# Patient Record
Sex: FEMALE | Race: WHITE | NOT HISPANIC OR LATINO
[De-identification: names, ages, dates, MRNs, and addresses within clinical notes are randomized per-mention and may not be internally consistent; named-entity substitution may affect disease eponyms.]

---

## 2017-08-08 ENCOUNTER — TRANSCRIPTION ENCOUNTER (OUTPATIENT)
Age: 37
End: 2017-08-08

## 2018-01-08 ENCOUNTER — TRANSCRIPTION ENCOUNTER (OUTPATIENT)
Age: 38
End: 2018-01-08

## 2019-01-31 ENCOUNTER — TRANSCRIPTION ENCOUNTER (OUTPATIENT)
Age: 39
End: 2019-01-31

## 2019-02-04 ENCOUNTER — TRANSCRIPTION ENCOUNTER (OUTPATIENT)
Age: 39
End: 2019-02-04

## 2019-04-07 ENCOUNTER — TRANSCRIPTION ENCOUNTER (OUTPATIENT)
Age: 39
End: 2019-04-07

## 2022-07-08 PROBLEM — Z00.00 ENCOUNTER FOR PREVENTIVE HEALTH EXAMINATION: Status: ACTIVE | Noted: 2022-07-08

## 2022-07-25 ENCOUNTER — APPOINTMENT (OUTPATIENT)
Dept: NEUROLOGY | Facility: CLINIC | Age: 42
End: 2022-07-25

## 2022-07-25 VITALS
SYSTOLIC BLOOD PRESSURE: 131 MMHG | TEMPERATURE: 98.3 F | OXYGEN SATURATION: 99 % | HEIGHT: 69 IN | DIASTOLIC BLOOD PRESSURE: 82 MMHG | WEIGHT: 213 LBS | HEART RATE: 71 BPM | BODY MASS INDEX: 31.55 KG/M2

## 2022-07-25 DIAGNOSIS — F41.9 ANXIETY DISORDER, UNSPECIFIED: ICD-10-CM

## 2022-07-25 PROCEDURE — 99205 OFFICE O/P NEW HI 60 MIN: CPT

## 2022-07-25 NOTE — PHYSICAL EXAM
[Person] : oriented to person [Place] : oriented to place [Time] : oriented to time [Concentration Intact] : normal concentrating ability [Visual Intact] : visual attention was ~T not ~L decreased [Naming Objects] : no difficulty naming common objects [Repeating Phrases] : no difficulty repeating a phrase [Writing A Sentence] : no difficulty writing a sentence [Fluency] : fluency intact [Comprehension] : comprehension intact [Reading] : reading intact [Past History] : adequate knowledge of personal past history [Cranial Nerves Optic (II)] : visual acuity intact bilaterally,  visual fields full to confrontation, pupils equal round and reactive to light [Cranial Nerves Oculomotor (III)] : extraocular motion intact [Cranial Nerves Trigeminal (V)] : facial sensation intact symmetrically [Cranial Nerves Facial (VII)] : face symmetrical [Cranial Nerves Vestibulocochlear (VIII)] : hearing was intact bilaterally [Motor Tone] : muscle tone was normal in all four extremities [Motor Strength] : muscle strength was normal in all four extremities [No Muscle Atrophy] : normal bulk in all four extremities [Motor Handedness Right-Handed] : the patient is right hand dominant [Sensation Tactile Decrease] : light touch was intact [Abnormal Walk] : normal gait [Balance] : balance was intact [Past-pointing] : there was no past-pointing [Tremor] : no tremor present [2+] : Ankle jerk left 2+ [Plantar Reflex Right Only] : normal on the right [Plantar Reflex Left Only] : normal on the left [FreeTextEntry4] : 2/3 at 5 m

## 2022-07-25 NOTE — HISTORY OF PRESENT ILLNESS
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  Normal milestones\par \par She has 2 children and works full-time.  She drives.\par \par Drinks alcohol.\par \par No history of head injuries, meningitis or any CNS disorders.\par \par No current history of systemic illnesses.\par No psychiatric history except for a history of anxiety for many years\par \par \par HPI\par \par Lorna reports that approximately in February 2021 she had a weird feeling and then lost awareness for a few seconds.  Eventually this episodes became more frequent and she was referred to see a neurologist.  She saw a neurologist in [place] Dr. Duggan who did an EEG that was normal and also an ambulatory EEG 48 hours were several episodes were captured.  She was told at one point that she had some type of seizures but did not start medications.  Eventually she went back to him and he told her that the EEG was normal.\par \par It is unclear what happened next but over the past 6 months these episodes have become more and more frequent.  She had a brain MRI that was normal.  Report is attached to the medical records.  She had also some blood work that was reported unremarkable.\par \par Recurrent episodes\par She described the episodes as a feeling of losing awareness for a few seconds.  She says she feels that they are very short.  She then comes back and is little and is a little bit slow but then she eventually recovers within seconds.  I spoke to the  on the phone who reported that she always does a very stereotyped type of behavior.\par She reports that she always verbalizes saying all my God oh my God all my God while she is moving her fingers and tapping her thumbs.  She is amnestic of the events.  The events according to him last between 10 and 15 seconds and another 5 seconds to recover.  He does not believe she is groggy or sleepy after these episodes.  The episodes are occurring almost every day and at times she may have up to 3 a day.\par With 2 of these episodes she had urinary incontinence.  She was standing on of the grocery stores to check out and then suddenly saw that she had an episode and was wet.  Another episode was when she was getting into the car.\par She says she has had episodes while driving and never had an accident.  She reports that she can think of certain things like relax and that is when the episodes will occur.\par Frequency: At least 1 a day.  Sometimes may go a day without them.\par Triggers: Relaxation or thinking about the episodes\par \par No anxiety in general associated with them\par

## 2022-07-25 NOTE — ASSESSMENT
[FreeTextEntry1] : Since she had already an EEG and an ambulatory EEG without a clear diagnosis I believe that she is required to have a video EEG to obtain a firm diagnosis.  If these are focal seizures then she will begin treatment with an anticonvulsant drug.  If they are not then appropriate diagnosis and treatment will need to follow since these are occurring almost every day.\par \par I told her that there is a risk of accidents with these episodes and she should refrain from driving.\par \par The patient would like to be admitted this week so we can sort this out as she has to be moving to [place] in the next few weeks

## 2022-07-25 NOTE — DISCUSSION/SUMMARY
[FreeTextEntry1] : 41-year-old woman with a year and a half history of possible partial seizures.  The behavior is stereotyped and the frequency may suggest frontal lobe epilepsy.\par The fact that she verbalizes during the episodes may lateralize her to the nondominant hemisphere.

## 2022-08-01 ENCOUNTER — TRANSCRIPTION ENCOUNTER (OUTPATIENT)
Age: 42
End: 2022-08-01

## 2022-08-01 ENCOUNTER — INPATIENT (INPATIENT)
Facility: HOSPITAL | Age: 42
LOS: 0 days | Discharge: LEFT BEFORE DISCHARGE | DRG: 101 | End: 2022-08-01
Attending: PSYCHIATRY & NEUROLOGY | Admitting: PSYCHIATRY & NEUROLOGY
Payer: COMMERCIAL

## 2022-08-01 VITALS
WEIGHT: 217.6 LBS | HEIGHT: 68 IN | RESPIRATION RATE: 18 BRPM | TEMPERATURE: 98 F | DIASTOLIC BLOOD PRESSURE: 84 MMHG | SYSTOLIC BLOOD PRESSURE: 123 MMHG | HEART RATE: 78 BPM | OXYGEN SATURATION: 98 %

## 2022-08-01 PROCEDURE — 95718 EEG PHYS/QHP 2-12 HR W/VEEG: CPT

## 2022-08-01 PROCEDURE — 99222 1ST HOSP IP/OBS MODERATE 55: CPT

## 2022-08-01 PROCEDURE — 95819 EEG AWAKE AND ASLEEP: CPT

## 2022-08-01 PROCEDURE — 95813 EEG EXTND MNTR 61-119 MIN: CPT

## 2022-08-01 RX ORDER — ACETAMINOPHEN 500 MG
650 TABLET ORAL EVERY 6 HOURS
Refills: 0 | Status: DISCONTINUED | OUTPATIENT
Start: 2022-08-01 | End: 2022-08-01

## 2022-08-01 NOTE — H&P ADULT - ATTENDING COMMENTS
agree with above H+P which was edited where appropriate. Admitted for VEEG with activating procedures for capture/ characterization of spells for diagnostic purposes.

## 2022-08-01 NOTE — DISCHARGE NOTE PROVIDER - CARE PROVIDER_API CALL
Seb Vernon)  Neurology  [address] [place] [address]  Phone: [phone]  Fax: [phone]  Established Patient  Follow Up Time:

## 2022-08-01 NOTE — DISCHARGE NOTE PROVIDER - NSDCCPCAREPLAN_GEN_ALL_CORE_FT
PRINCIPAL DISCHARGE DIAGNOSIS  Diagnosis: Focal epilepsy with impairment of consciousness  Assessment and Plan of Treatment:       SECONDARY DISCHARGE DIAGNOSES  Diagnosis: Anxiety  Assessment and Plan of Treatment:

## 2022-08-01 NOTE — DISCHARGE NOTE PROVIDER - HOSPITAL COURSE
49 yo right handed F w/o significant PMH p/w episodes of paroxysmal behavioral changes associated with amnesia for approximately 1.5 years. Per the patient she was experiencing these episodes since February 2021. She described the episodes as a feeling of losing awareness for a few seconds. She feels that they are very short. She then comes back and is little and is a little bit slow but then she eventually recovers within seconds. She reports that she always verbalizes saying "Oh my God oh my God all my God" while she is moving her fingers and tapping her thumbs. She is amnestic of the events. The events according to him last between 10 and 15 seconds and another 5 seconds to recover. He does not believe she is groggy or sleepy after these episodes. The episodes are occurring almost every day and at times she may have up to 3 a day. With 2 of these episodes she had urinary incontinence. She was standing on of the grocery stores to check out and then suddenly saw that she had an episode and was wet. Another episode was when she was getting into the car.  She says she has had episodes while driving and never had an accident. She reports that she can think of certain things like relax and that is when the episodes will occur. Frequency: At least 1 a day. Sometimes may go a day without them. Triggers: Relaxation or thinking about the episodes.   Eventually this episodes became more frequent and she was referred to see a neurologist. She saw a neurologist in [place] Dr. Duggan who did an EEG that was normal and also an ambulatory EEG 48 hours were several episodes were captured. She was told at one point that she had some type of seizures but did not start medications. Eventually she went back to him and he told her that the EEG was normal. Over the past 6 months these episodes have become more and more frequent. She had a brain MRI that was normal. She had also some blood work that was reported unremarkable.   She was admitted to EMU for 24 h VEEG and initially refused for IV-line and was cautious about diagnostic lab draws then she agrees to do only once. Soon after VEEG leads were hooked up she removed the EEG wires and eloped with refusing sign of AMA form.     47 yo right handed F w/o significant PMH p/w episodes of paroxysmal behavioral changes associated with amnesia for approximately 1.5 years. Per the patient she was experiencing these episodes since February 2021. She described the episodes as a feeling of losing awareness for a few seconds. She feels that they are very short. She then comes back and is little and is a little bit slow but then she eventually recovers within seconds. She reports that she always verbalizes saying "Oh my God oh my God all my God" while she is moving her fingers and tapping her thumbs. She is amnestic of the events. The events according to him last between 10 and 15 seconds and another 5 seconds to recover. He does not believe she is groggy or sleepy after these episodes. The episodes are occurring almost every day and at times she may have up to 3 a day. With 2 of these episodes she had urinary incontinence. She was standing on of the grocery stores to check out and then suddenly saw that she had an episode and was wet. Another episode was when she was getting into the car.  She says she has had episodes while driving and never had an accident. She reports that she can think of certain things like relax and that is when the episodes will occur. Frequency: At least 1 a day. Sometimes may go a day without them. Triggers: Relaxation or thinking about the episodes.   Eventually this episodes became more frequent and she was referred to see a neurologist. She saw a neurologist in [place] Dr. Duggan who did an EEG that was normal and also an ambulatory EEG 48 hours were several episodes were captured. She was told at one point that she had some type of seizures but did not start medications. Eventually she went back to him and he told her that the EEG was normal. Over the past 6 months these episodes have become more and more frequent. She had a brain MRI that was normal. She had also some blood work that was reported unremarkable.     She was admitted to EMU for 24 h VEEG and initially refused for IV-line and was cautious about diagnostic lab draws then she agrees to do only once. Soon after VEEG leads were hooked up she removed the EEG wires and eloped with refusing sign of AMA form reportedly stating that there were too many rules here. Dr. Vernon, referring MD made aware.

## 2022-08-01 NOTE — PROVIDER CONTACT NOTE (OTHER) - SITUATION
Patient refusing peripheral IV. Patient educated on risk of seizure and seizure precautions. Patient continued to refuse.

## 2022-08-01 NOTE — H&P ADULT - ASSESSMENT
41-year-old woman with a year and a half history of possible partial seizures. The behavior is stereotyped and the frequency may suggest frontal lobe epilepsy. The fact that she verbalizes during the episodes may lateralize her to the nondominant hemisphere. Since she had already an EEG and an ambulatory EEG without a clear diagnosis she is required to have a video EEG to obtain a firm diagnosis. If these are focal seizures then she will begin treatment with an anticonvulsant drug. If they are not then appropriate diagnosis and treatment will need to follow since these are occurring almost every day.    Assessment:     Epilepsy, focal (345.50) (G40.109)    Anxiety (300.00) (F41.9)      Plan:     VEEG - 24h

## 2022-08-01 NOTE — H&P ADULT - NSHPREVIEWOFSYSTEMS_GEN_ALL_CORE
CONSTITUTIONAL: Positive for 7lb unintentional weight loss negative for fever, chills, weakness or fatigue.     HEENT:  Eyes:  No visual loss, blurred vision, double vision or yellow sclerae. Ears, Nose, Throat:  No hearing loss, sneezing, congestion, runny nose or sore throat.     SKIN:  No rash or itching.     CARDIOVASCULAR:  No chest pain, chest pressure or chest discomfort. No palpitations or edema.     RESPIRATORY:  No shortness of breath, cough or sputum.     GASTROINTESTINAL:  No anorexia, nausea, vomiting or diarrhea. No abdominal pain or blood.     GENITOURINARY: No Burning on urination.      NEUROLOGICAL:  see HPI     MUSCULOSKELETAL:  No muscle, back pain, joint pain or stiffness.     HEMATOLOGIC:  No anemia, bleeding or bruising.     LYMPHATICS:  No enlarged nodes. No history of splenectomy.     PSYCHIATRIC:  No history of depression. Admits she is anxious.     ENDOCRINOLOGIC:  No reports of sweating, cold or heat intolerance. No polyuria or polydipsia.     ALLERGIES:  No history of asthma, hives, eczema or rhinitis.

## 2022-08-01 NOTE — H&P ADULT - HISTORY OF PRESENT ILLNESS
47 yo right handed F w/o significant PMH p/w episodes of paroxysmal behavioral changes associated with amnesia for approximately 1.5 years. Per the patient she was experiencing these episodes since February 2021. She described the episodes as a feeling of losing awareness for a few seconds. She feels that they are very short. She then comes back and is little and is a little bit slow but then she eventually recovers within seconds. She reports that she always verbalizes saying all my God oh my God all my God while she is moving her fingers and tapping her thumbs. She is amnestic of the events. The events according to him last between 10 and 15 seconds and another 5 seconds to recover. He does not believe she is groggy or sleepy after these episodes. The episodes are occurring almost every day and at times she may have up to 3 a day. With 2 of these episodes she had urinary incontinence. She was standing on of the grocery stores to check out and then suddenly saw that she had an episode and was wet. Another episode was when she was getting into the car.  She says she has had episodes while driving and never had an accident. She reports that she can think of certain things like relax and that is when the episodes will occur. Frequency: At least 1 a day. Sometimes may go a day without them. Triggers: Relaxation or thinking about the episodes.   Eventually this episodes became more frequent and she was referred to see a neurologist. She saw a neurologist in [place] Dr. Duggan who did an EEG that was normal and also an ambulatory EEG 48 hours were several episodes were captured. She was told at one point that she had some type of seizures but did not start medications. Eventually she went back to him and he told her that the EEG was normal. Over the past 6 months these episodes have become more and more frequent. She had a brain MRI that was normal. She had also some blood work that was reported unremarkable. She is here for 24 h VEEG

## 2022-08-01 NOTE — PATIENT PROFILE ADULT - FALL HARM RISK - HARM RISK INTERVENTIONS

## 2022-08-01 NOTE — H&P ADULT - NSHPSOCIALHISTORY_GEN_ALL_CORE
, has 2 children, works full-time. No smoking, drinks socially. LMP - she is in the middle of her normal periods , lives with  and her 2 children, works full-time. No smoking, drinks socially. LMP - she is in the middle of her normal periods

## 2022-08-01 NOTE — H&P ADULT - NSHPPHYSICALEXAM_GEN_ALL_CORE
Orientation: oriented to person, oriented to place and oriented to time.   Attention: normal concentrating ability and visual attention was not decreased.   Language: no difficulty naming common objects, no difficulty repeating a phrase, no difficulty writing a sentence, fluency intact, comprehension intact and reading intact.   Fund of knowledge: displays adequate knowledge of personal past history.   Cranial Nerves: visual acuity intact bilaterally, visual fields full to confrontation, pupils equal round and reactive to light, extraocular motion intact, facial sensation intact symmetrically, face symmetrical and hearing was intact bilaterally.   Motor: muscle tone was normal in all four extremities, muscle strength was normal in all four extremities and normal bulk in all four extremities.   Motor Strength:. the patient is right hand dominant.   Sensory exam: light touch was intact.   Coordination:. normal gait. balance was intact. there was no past-pointing. no tremor present.   Deep tendon reflexes:   Biceps right 2+. Biceps left 2+.    Triceps right 2+. Triceps left 2+.    Brachioradialis right 2+. Brachioradialis left 2+.    Patella right 2+. Patella left 2+.    Ankle jerk right 2+. Ankle jerk left 2+.   Plantar responses normal on the right, normal on the left.

## 2022-08-02 PROBLEM — Z78.9 OTHER SPECIFIED HEALTH STATUS: Chronic | Status: ACTIVE | Noted: 2022-08-01

## 2022-08-02 NOTE — EEG REPORT - NS EEG TEXT BOX
[facility] Department of Neurology  Inpatient Continuous video-Electroencephalogram    Patient Name:	LORY MITCHELL    :	1980  MRN:	[id number]    Study Start Date/Time:  2022, 12:14 PM  Study End Date/Time:	2022, 2:31 PM    Referred by: Seb Vernon MD    Brief Clinical History:  LORY MITCHELL is a 41 year-old female with very brief amnestic episodes of unclear etiology; study performed to investigate for seizures or markers of epilepsy.    Diagnosis Code:   R56.9 convulsions/seizure  The live video was: monitored continuously by trained technicians.    Pertinent Medications:  n/a    Acquisition Details:  Electroencephalography was acquired using a minimum of 21 channels on an [employer] system v 8.5.1 with electrode placement according to the standard International 10-20 system following ACNS (American Clinical Neurophysiology Society) guidelines for Long-Term Video EEG monitoring.  Anterior temporal T1 and T2 electrodes were utilized whenever possible.   The [employer] automated spike & seizure detections were all reviewed in detail, in addition to extensive portions of raw EEG.    Day 1: 2022 @ 12:14 PM to 2:31 PM  Background:  continuous, with predominantly alpha and beta frequencies.  Symmetry:  No persistent asymmetries of voltage or frequency.  Posterior Dominant Rhythm:  11 Hz symmetric, well-organized, and well-modulated.  Organization: Appropriate anterior-posterior gradient.  Voltage:  Normal (20+ uV)  Variability: Yes. 		Reactivity: Yes.  N2 sleep: not captured  Spontaneous Activity:  No epileptiform discharges.  Periodic/rhythmic activity:  None  Events:  No electrographic seizures or significant clinical events.  Provocations:  Hyperventilation and Photic stimulation: did not evoke EEG abnormalities.    Daily Summary:    Normal awake EEG recording. Study aborted prematurely at patient request.     John Lin DO  CNP Fellow    Lorene Joseph DO  Attending Neurologist, [facility]

## 2022-08-03 ENCOUNTER — APPOINTMENT (OUTPATIENT)
Dept: NEUROLOGY | Facility: CLINIC | Age: 42
End: 2022-08-03

## 2022-08-03 PROCEDURE — 95819 EEG AWAKE AND ASLEEP: CPT

## 2022-08-04 ENCOUNTER — APPOINTMENT (OUTPATIENT)
Dept: NEUROLOGY | Facility: CLINIC | Age: 42
End: 2022-08-04

## 2022-08-04 ENCOUNTER — APPOINTMENT (OUTPATIENT)
Dept: MRI IMAGING | Facility: HOSPITAL | Age: 42
End: 2022-08-04

## 2022-08-04 ENCOUNTER — OUTPATIENT (OUTPATIENT)
Dept: OUTPATIENT SERVICES | Facility: HOSPITAL | Age: 42
LOS: 1 days | End: 2022-08-04
Payer: COMMERCIAL

## 2022-08-04 ENCOUNTER — RESULT REVIEW (OUTPATIENT)
Age: 42
End: 2022-08-04

## 2022-08-04 VITALS
HEIGHT: 69 IN | SYSTOLIC BLOOD PRESSURE: 124 MMHG | HEART RATE: 74 BPM | OXYGEN SATURATION: 98 % | DIASTOLIC BLOOD PRESSURE: 80 MMHG | TEMPERATURE: 99.1 F

## 2022-08-04 DIAGNOSIS — G40.109 LOCALIZATION-RELATED (FOCAL) (PARTIAL) SYMPTOMATIC EPILEPSY AND EPILEPTIC SYNDROMES WITH SIMPLE PARTIAL SEIZURES, NOT INTRACTABLE, WITHOUT STATUS EPILEPTICUS: ICD-10-CM

## 2022-08-04 DIAGNOSIS — Z53.29 PROCEDURE AND TREATMENT NOT CARRIED OUT BECAUSE OF PATIENT'S DECISION FOR OTHER REASONS: ICD-10-CM

## 2022-08-04 DIAGNOSIS — F41.9 ANXIETY DISORDER, UNSPECIFIED: ICD-10-CM

## 2022-08-04 PROCEDURE — 99214 OFFICE O/P EST MOD 30 MIN: CPT

## 2022-08-04 PROCEDURE — A9585: CPT

## 2022-08-04 PROCEDURE — 95708 EEG WO VID EA 12-26HR UNMNTR: CPT

## 2022-08-04 PROCEDURE — 95700 EEG CONT REC W/VID EEG TECH: CPT

## 2022-08-04 PROCEDURE — 70553 MRI BRAIN STEM W/O & W/DYE: CPT | Mod: 26

## 2022-08-04 PROCEDURE — 95719 EEG PHYS/QHP EA INCR W/O VID: CPT

## 2022-08-04 PROCEDURE — 70553 MRI BRAIN STEM W/O & W/DYE: CPT

## 2022-08-04 NOTE — HISTORY OF PRESENT ILLNESS
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  Normal milestones\par \par She has 2 children and works full-time.  She drives.\par \par Drinks alcohol.\par \par No history of head injuries, meningitis or any CNS disorders.\par \par No current history of systemic illnesses.\par No psychiatric history except for a history of anxiety for many years\par \par \par HPI\par \par Lorna reports that approximately in February 2021 she had a weird feeling and then lost awareness for a few seconds.  Eventually this episodes became more frequent and she was referred to see a neurologist.  She saw a neurologist in [place] Dr. Duggan who did an EEG that was normal and also an ambulatory EEG 48 hours were several episodes were captured.  She was told at one point that she had some type of seizures but did not start medications.  Eventually she went back to him and he told her that the EEG was normal.\par \par It is unclear what happened next but over the past 6 months these episodes have become more and more frequent.  She had a brain MRI that was normal.  Report is attached to the medical records.  She had also some blood work that was reported unremarkable.\par \par Recurrent episodes\par She described the episodes as a feeling of losing awareness for a few seconds.  She says she feels that they are very short.  She then comes back and is little and is a little bit slow but then she eventually recovers within seconds.  I spoke to the  on the phone who reported that she always does a very stereotyped type of behavior.\par She reports that she always verbalizes saying all my God oh my God all my God while she is moving her fingers and tapping her thumbs.  She is amnestic of the events.  The events according to him last between 10 and 15 seconds and another 5 seconds to recover.  He does not believe she is groggy or sleepy after these episodes.  The episodes are occurring almost every day and at times she may have up to 3 a day.\par With 2 of these episodes she had urinary incontinence.  She was standing on of the grocery stores to check out and then suddenly saw that she had an episode and was wet.  Another episode was when she was getting into the car.\par She says she has had episodes while driving and never had an accident.  She reports that she can think of certain things like relax and that is when the episodes will occur.\par Frequency: At least 1 a day.  Sometimes may go a day without them.\par Triggers: Relaxation or thinking about the episodes\par \par No anxiety in general associated with them\par \par HPI    8/4/22\par \par Admitted to hospital last week x VEEG. She got hook up and 2-3 hs later AMA because she couldn’t stand it. 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 \par REEG today shows freq Left temporal sharp waves

## 2022-08-04 NOTE — DISCUSSION/SUMMARY
[FreeTextEntry1] : 41-year-old woman with likely partial seizures.  The behavior is stereotyped and the frequency may suggest frontal lobe vs temporal epilepsy.\par MRI reported normal but it is not. 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

## 2022-08-04 NOTE — ASSESSMENT
[FreeTextEntry1] : Discussed starting AEDs. Choice of ZON, LAC, LTG\par Repeat MRI\par See result of AEEG\par \par \par Treatment, side effects and dosages were discussed in detail.\par \par Risk of allergic reactions, mood changes, lab etc discussed in details.\par \par Seizure precautions discussed in detail including restrictions on driving, machinery, etc. \par \par Seizure triggers including ETOH intake, drugs discussed in detail\par \par Patient questions answered.\par \par \par

## 2022-08-05 ENCOUNTER — APPOINTMENT (OUTPATIENT)
Dept: MRI IMAGING | Facility: HOSPITAL | Age: 42
End: 2022-08-05

## 2022-08-23 ENCOUNTER — TRANSCRIPTION ENCOUNTER (OUTPATIENT)
Age: 42
End: 2022-08-23

## 2022-08-29 ENCOUNTER — TRANSCRIPTION ENCOUNTER (OUTPATIENT)
Age: 42
End: 2022-08-29

## 2022-08-31 ENCOUNTER — APPOINTMENT (OUTPATIENT)
Dept: NEUROLOGY | Facility: CLINIC | Age: 42
End: 2022-08-31

## 2022-09-12 ENCOUNTER — TRANSCRIPTION ENCOUNTER (OUTPATIENT)
Age: 42
End: 2022-09-12

## 2022-09-13 ENCOUNTER — APPOINTMENT (OUTPATIENT)
Dept: NEUROLOGY | Facility: CLINIC | Age: 42
End: 2022-09-13

## 2022-09-13 PROCEDURE — 99214 OFFICE O/P EST MOD 30 MIN: CPT | Mod: 95

## 2022-09-13 NOTE — REASON FOR VISIT
[Home] : at home, [unfilled] , at the time of the visit. [Medical Office: ([facility])___] : at the medical office located in  [Patient] : the patient [Follow-Up: _____] : a [unfilled] follow-up visit

## 2022-09-13 NOTE — ASSESSMENT
[FreeTextEntry1] : Plan\par Increase Vimpat by 50 mg a week until she reaches her seizure freedom.\par She will have a repeat MRI on October 5 with the neurosurgeon at [place] and I told her to bring the MRI and I will see her on October 6.\par \par I asked her to call me if she has any issues.\par \par

## 2022-09-13 NOTE — HISTORY OF PRESENT ILLNESS
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  Normal milestones\par \par She has 2 children and works full-time.  She drives.\par \par Drinks alcohol.\par \par No history of head injuries, meningitis or any CNS disorders.\par \par No current history of systemic illnesses.\par No psychiatric history except for a history of anxiety for many years\par \par \par PMHx\par \par Lorna reports that approximately in February 2021 she had a weird feeling and then lost awareness for a few seconds.  Eventually this episodes became more frequent and she was referred to see a neurologist.  She saw a neurologist in [place] Dr. Duggan who did an EEG that was normal and also an ambulatory EEG 48 hours were several episodes were captured.  She was told at one point that she had some type of seizures but did not start medications.  Eventually she went back to him and he told her that the EEG was normal.\par \par It is unclear what happened next but over the past 6 months these episodes have become more and more frequent.  She had a brain MRI that was normal.  Report is attached to the medical records.  She had also some blood work that was reported unremarkable.\par \par Recurrent episodes\par She described the episodes as a feeling of losing awareness for a few seconds.  She says she feels that they are very short.  She then comes back and is little and is a little bit slow but then she eventually recovers within seconds.  I spoke to the  on the phone who reported that she always does a very stereotyped type of behavior.\par She reports that she always verbalizes saying all my God oh my God all my God while she is moving her fingers and tapping her thumbs.  She is amnestic of the events.  The events according to him last between 10 and 15 seconds and another 5 seconds to recover.  He does not believe she is groggy or sleepy after these episodes.  The episodes are occurring almost every day and at times she may have up to 3 a day.\par With 2 of these episodes she had urinary incontinence.  She was standing on of the grocery stores to check out and then suddenly saw that she had an episode and was wet.  Another episode was when she was getting into the car.\par She says she has had episodes while driving and never had an accident.  She reports that she can think of certain things like relax and that is when the episodes will occur.\par Frequency: At least 1 a day.  Sometimes may go a day without them.\par Triggers: Relaxation or thinking about the episodes\par \par No anxiety in general associated with them\par \par HPI     9/13/22\par \par Lorna has been doing better on Vimpat 100 mg twice a day which she increased over the past several weeks.\par She is working from [place] permanently.\par No side effects from the medication.\par She saw a neurosurgeon at [place] who told her that the left amygdala looked bigger but he could not confirm the diagnosis.\par She seems to have less anxiety as well.\par She lost her job and she is now in transition.  Kids in school in [place].  She is returning here in a few weeks.\par \par HPI    8/4/22\par \par Admitted to hospital last week x VEEG. She got hook up and 2-3 hs later AMA because she couldn’t stand it. 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 \par REEG today shows freq Left temporal sharp waves

## 2022-09-13 NOTE — DISCUSSION/SUMMARY
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  Now's she is down to maybe 1 seizure a week.  These are all simple partial events.

## 2022-09-13 NOTE — PHYSICAL EXAM
[Person] : oriented to person [Span Intact] : the attention span was normal [Naming Objects] : no difficulty naming common objects [Cranial Nerves Optic (II)] : visual acuity intact bilaterally,  visual fields full to confrontation, pupils equal round and reactive to light

## 2022-09-29 ENCOUNTER — TRANSCRIPTION ENCOUNTER (OUTPATIENT)
Age: 42
End: 2022-09-29

## 2022-09-30 ENCOUNTER — TRANSCRIPTION ENCOUNTER (OUTPATIENT)
Age: 42
End: 2022-09-30

## 2022-10-03 RX ORDER — LACOSAMIDE 50 MG/1
50 TABLET ORAL
Qty: 120 | Refills: 0 | Status: COMPLETED | COMMUNITY
Start: 2022-09-30 | End: 2022-10-03

## 2022-10-06 ENCOUNTER — APPOINTMENT (OUTPATIENT)
Dept: NEUROLOGY | Facility: CLINIC | Age: 42
End: 2022-10-06

## 2022-10-06 VITALS
SYSTOLIC BLOOD PRESSURE: 110 MMHG | DIASTOLIC BLOOD PRESSURE: 78 MMHG | HEART RATE: 87 BPM | WEIGHT: 218 LBS | HEIGHT: 69 IN | BODY MASS INDEX: 32.29 KG/M2 | TEMPERATURE: 98.1 F | OXYGEN SATURATION: 98 %

## 2022-10-06 PROCEDURE — 99214 OFFICE O/P EST MOD 30 MIN: CPT

## 2022-10-06 NOTE — PHYSICAL EXAM
[Person] : oriented to person [Span Intact] : the attention span was normal [Naming Objects] : no difficulty naming common objects [Cranial Nerves Optic (II)] : visual acuity intact bilaterally,  visual fields full to confrontation, pupils equal round and reactive to light [Cranial Nerves Trigeminal (V)] : facial sensation intact symmetrically [Cranial Nerves Facial (VII)] : face symmetrical

## 2022-10-06 NOTE — HISTORY OF PRESENT ILLNESS
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  Normal milestones\par \par She has 2 children and works full-time.  She drives.\par \par Drinks alcohol.\par \par No history of head injuries, meningitis or any CNS disorders.\par \par No current history of systemic illnesses.\par No psychiatric history except for a history of anxiety for many years\par \par \par PMHx\par \par Lorna reports that approximately in February 2021 she had a weird feeling and then lost awareness for a few seconds.  Eventually this episodes became more frequent and she was referred to see a neurologist.  She saw a neurologist in [place] Dr. Duggan who did an EEG that was normal and also an ambulatory EEG 48 hours were several episodes were captured.  She was told at one point that she had some type of seizures but did not start medications.  Eventually she went back to him and he told her that the EEG was normal.\par \par It is unclear what happened next but over the past 6 months these episodes have become more and more frequent.  She had a brain MRI that was normal.  Report is attached to the medical records.  She had also some blood work that was reported unremarkable.\par \par Recurrent episodes\par She described the episodes as a feeling of losing awareness for a few seconds.  She says she feels that they are very short.  She then comes back and is little and is a little bit slow but then she eventually recovers within seconds.  I spoke to the  on the phone who reported that she always does a very stereotyped type of behavior.\par She reports that she always verbalizes saying all my God oh my God all my God while she is moving her fingers and tapping her thumbs.  She is amnestic of the events.  The events according to him last between 10 and 15 seconds and another 5 seconds to recover.  He does not believe she is groggy or sleepy after these episodes.  The episodes are occurring almost every day and at times she may have up to 3 a day.\par With 2 of these episodes she had urinary incontinence.  She was standing on of the grocery stores to check out and then suddenly saw that she had an episode and was wet.  Another episode was when she was getting into the car.\par She says she has had episodes while driving and never had an accident.  She reports that she can think of certain things like relax and that is when the episodes will occur.\par Frequency: At least 1 a day.  Sometimes may go a day without them.\par Triggers: Relaxation or thinking about the episodes\par \par No anxiety in general associated with them\par \par HPI   10/6/22\par  Continue to have some seizures. Less frequent sz\par \par Meds:\par Vimpat 100 am and 150 mg pm\par \par MRI done yesterday at [place]. I reviewed the MRI. Still small thickening of Left amygdala \par \par HPI     9/13/22\par \par Lorna has been doing better on Vimpat 100 mg twice a day which she increased over the past several weeks.\par She is working from [place] permanently.\par No side effects from the medication.\par She saw a neurosurgeon at [place] who told her that the left amygdala looked bigger but he could not confirm the diagnosis.\par She seems to have less anxiety as well.\par She lost her job and she is now in transition.  Kids in school in [place].  She is returning here in a few weeks.\par \par HPI    8/4/22\par \par Admitted to hospital last week x VEEG. She got hook up and 2-3 hs later AMA because she couldn’t stand it. 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 \par REEG today shows freq Left temporal sharp waves

## 2022-10-20 PROBLEM — Z00.00 ENCOUNTER FOR PREVENTIVE HEALTH EXAMINATION: Noted: 2022-10-20

## 2022-10-26 ENCOUNTER — OUTPATIENT (OUTPATIENT)
Dept: OUTPATIENT SERVICES | Facility: HOSPITAL | Age: 42
LOS: 1 days | End: 2022-10-26
Payer: COMMERCIAL

## 2022-10-26 ENCOUNTER — APPOINTMENT (OUTPATIENT)
Dept: NUCLEAR MEDICINE | Facility: IMAGING CENTER | Age: 42
End: 2022-10-26

## 2022-10-26 ENCOUNTER — RESULT REVIEW (OUTPATIENT)
Age: 42
End: 2022-10-26

## 2022-10-26 DIAGNOSIS — Z00.8 ENCOUNTER FOR OTHER GENERAL EXAMINATION: ICD-10-CM

## 2022-10-26 DIAGNOSIS — G40.109 LOCALIZATION-RELATED (FOCAL) (PARTIAL) SYMPTOMATIC EPILEPSY AND EPILEPTIC SYNDROMES WITH SIMPLE PARTIAL SEIZURES, NOT INTRACTABLE, WITHOUT STATUS EPILEPTICUS: ICD-10-CM

## 2022-10-26 PROCEDURE — 78608 BRAIN IMAGING (PET): CPT

## 2022-10-26 PROCEDURE — 78608 BRAIN IMAGING (PET): CPT | Mod: 26,76

## 2022-10-26 PROCEDURE — A9552: CPT

## 2022-10-26 PROCEDURE — 78999 UNLISTED MISC PX DX NUC MED: CPT | Mod: 26

## 2022-10-26 PROCEDURE — 78999 UNLISTED MISC PX DX NUC MED: CPT

## 2022-11-02 ENCOUNTER — TRANSCRIPTION ENCOUNTER (OUTPATIENT)
Age: 42
End: 2022-11-02

## 2022-11-08 ENCOUNTER — APPOINTMENT (OUTPATIENT)
Dept: NEUROLOGY | Facility: CLINIC | Age: 42
End: 2022-11-08

## 2022-12-06 ENCOUNTER — RX RENEWAL (OUTPATIENT)
Age: 42
End: 2022-12-06

## 2023-01-01 NOTE — PATIENT PROFILE ADULT - NSPRESCRALCSIXMORE_GEN_A_NUR
"Patient Name: Robert Hart"  Medical Records Number: [id number]  YOB: 2024  Date of Appointment: 2023    Genetic counseling (Natasha Reddy, [facility], [facility]) met with the family of Robert Mejía on 2023 following to evaluation by Madie Hess MD, medical geneticist. The patient is currently admitted to the [facility] NICU. His parents and maternal grandparents were present for genetic counseling intake, which occurred in the waiting room per family preference. Total time spent in counseling and discussion totaled 20 minutes (Face-to-face: 20 minutes; Non face-to-face including preparation, medical record review, and literature review: 30 minutes). This document provides a written summary of topics discussed.     Patient Medical History  Robert Hammond is a 13 days male referred for genetic counseling and medical genetics evaluation based on a history of poor feeding, abnormal tone, and abnormal reflexes. At the time of the appointment, the family reported some anxiety around meeting with Genetics. These concerns were discussed as appropriate. A history of present illness and review of systems were collected.     Robert Hammond was delivered via repeat  at 39+1/7 weeks' gestation. His  mother was 34 and his father was 35. The pregnancy was unplanned and detected very early in the first trimester. Robert Hammond's mother reports good access to prenatal care and close monitoring due to her suboxone therapy and hepatitis C infection status. Robert Hammond's mother reports that she experienced significant swelling and nausea/vomiting in this pregnancy, similar to her previous pregnancy. She reports that Robert Hammond was more active in utero than her twin daughters did. She did not pursue any genetic testing or screening during the pregnancy. No fetal anomalies were noted prenatally. At delivery, Robert Hammond was 7lb 6.5oz, 49cm in length, and had a head circumference of 33cm. " "APGAR scores were 8/8. Due to respiratory distress and desats, Robert Hammond was admitted to the NICU for respiratory support. The family was told he had "swallowed fluid."    Upon admission to the NICU, Robert Hammond was noted to have abnormal tone, with particular hypertonia in the lower extremities. He has abnormal primitive refluxes, including a reduced or absent suck reflex, leading to poor feeding. An MRI of the brain was reported as normal; this study was limited due to significant motion artifact.     On physical exam, Dr. Hess noted distinctive facial and physical features, including macrodactyly of the great toes. The family report that Robert Hammond strongly resembles his maternal uncle, as well as his father's baby pictures. They also report that "big feet, big toes, and finger toes" are maternal family traits.      Additional medical history is notable for perineal dermatitis and  abstinence syndrome (managed with PRN morphine). Review of systems was otherwise negative.     When discharged, Robert Hammond will live in [place] with his parents and sisters.     Patient Family History  A family history was collected for Robert Hammond, with information provided by his parents and grandparents. A complete pedigree has been included in the medical record. Within the immediate family, Robert Hammond has three half-siblings, all healthy. His mother is 34 and has allergies. His father is 35 and has no medical concerns. Robert Hammond's mother experienced a first trimester miscarriage at age 18 with a previous partner.      Maternal family history includes no known individuals with diagnosed genetic disorders. A great-great aunt had "mental retardation," with reported onset following a head injury. A maternal uncle  at age 24 in a motorcycle accident. There is a distant family history of cancer (breast, colon, prostate) and diabetes. Maternal ancestry is Non- White (Northern Irish, Georgian, Stateless, Turks and [place] Islander). "     Paternal family history includes no known individuals with diagnosed genetic disorders. Paternal ancestry is Non- White (Lao, Sami, Turkish). Consanguinity was denied.        Recommendations  Please review clinical documentation by Dr. Hess for complete medical management and referral recommendations. The family is aware that I will contact them to discuss the plan for genetic testing. They are aware that genetic testing is offered as a resource for the family but is not mandatory, and does not dictate the length of Robert Hammond's NICU admission.    It was a pleasure meeting with Robert Hammond's family family. The family is encouraged to contact the Department of Genetics with any questions or concerns.        Zeus Reddy, [facility], [facility]  Senior Genetic Counselor  [facility]  [email or web address]      Never

## 2023-01-20 ENCOUNTER — RX RENEWAL (OUTPATIENT)
Age: 43
End: 2023-01-20

## 2023-01-30 ENCOUNTER — APPOINTMENT (OUTPATIENT)
Dept: NEUROLOGY | Facility: CLINIC | Age: 43
End: 2023-01-30
Payer: COMMERCIAL

## 2023-01-30 ENCOUNTER — RX RENEWAL (OUTPATIENT)
Age: 43
End: 2023-01-30

## 2023-01-30 PROCEDURE — 99214 OFFICE O/P EST MOD 30 MIN: CPT | Mod: 95

## 2023-01-30 NOTE — DISCUSSION/SUMMARY
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  Now's she is down to maybe 1 seizure a week.  These are all simple partial events.

## 2023-01-30 NOTE — ASSESSMENT
[FreeTextEntry1] : Vimpat level\par If the level is low we may increase dose by 50 mg and see if we can control 100% the seizures.

## 2023-01-30 NOTE — REASON FOR VISIT
[Home] : at home, [unfilled] , at the time of the visit. [Medical Office: ([facility])___] : at the medical office located in  [Patient] : the patient [Follow-Up: _____] : a [unfilled] follow-up visit

## 2023-01-30 NOTE — HISTORY OF PRESENT ILLNESS
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  Normal milestones\par \par She has 2 children and works full-time.  She drives.\par \par Drinks alcohol.\par \par No history of head injuries, meningitis or any CNS disorders.\par \par No current history of systemic illnesses.\par No psychiatric history except for a history of anxiety for many years\par \par \par PMHx\par \par Lorna reports that approximately in February 2021 she had a weird feeling and then lost awareness for a few seconds.  Eventually this episodes became more frequent and she was referred to see a neurologist.  She saw a neurologist in [place] Dr. Duggan who did an EEG that was normal and also an ambulatory EEG 48 hours were several episodes were captured.  She was told at one point that she had some type of seizures but did not start medications.  Eventually she went back to him and he told her that the EEG was normal.\par \par It is unclear what happened next but over the past 6 months these episodes have become more and more frequent.  She had a brain MRI that was normal.  Report is attached to the medical records.  She had also some blood work that was reported unremarkable.\par \par Recurrent episodes\par She described the episodes as a feeling of losing awareness for a few seconds.  She says she feels that they are very short.  She then comes back and is little and is a little bit slow but then she eventually recovers within seconds.  I spoke to the  on the phone who reported that she always does a very stereotyped type of behavior.\par She reports that she always verbalizes saying all my God oh my God all my God while she is moving her fingers and tapping her thumbs.  She is amnestic of the events.  The events according to him last between 10 and 15 seconds and another 5 seconds to recover.  He does not believe she is groggy or sleepy after these episodes.  The episodes are occurring almost every day and at times she may have up to 3 a day.\par With 2 of these episodes she had urinary incontinence.  She was standing on of the grocery stores to check out and then suddenly saw that she had an episode and was wet.  Another episode was when she was getting into the car.\par She says she has had episodes while driving and never had an accident.  She reports that she can think of certain things like relax and that is when the episodes will occur.\par Frequency: At least 1 a day.  Sometimes may go a day without them.\par Triggers: Relaxation or thinking about the episodes\par \par No anxiety in general associated with them\par \par HPI    1/30/2023\par \par No further seizures\par \par Meds; Vimpat 200 mg BID\par \par Gets auras from time to time. Once a day.\par Anxiety.\par \par \par HPI   10/6/22\par  Continue to have some seizures. Less frequent sz\par \par Meds:\par Vimpat 100 am and 150 mg pm\par \par MRI done yesterday at [place]. I reviewed the MRI. Still small thickening of Left amygdala \par \par HPI     9/13/22\par \par Lorna has been doing better on Vimpat 100 mg twice a day which she increased over the past several weeks.\par She is working from [place] permanently.\par No side effects from the medication.\par She saw a neurosurgeon at [place] who told her that the left amygdala looked bigger but he could not confirm the diagnosis.\par She seems to have less anxiety as well.\par She lost her job and she is now in transition.  Kids in school in [place].  She is returning here in a few weeks.\par \par HPI    8/4/22\par \par Admitted to hospital last week x VEEG. She got hook up and 2-3 hs later AMA because she couldn’t stand it. 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 \par REEG today shows freq Left temporal sharp waves

## 2023-01-30 NOTE — PHYSICAL EXAM
[Person] : oriented to person [Place] : oriented to place [Time] : oriented to time [Cranial Nerves Optic (II)] : visual acuity intact bilaterally,  visual fields full to confrontation, pupils equal round and reactive to light [Motor Handedness Right-Handed] : the patient is right hand dominant [Abnormal Walk] : normal gait

## 2023-02-21 ENCOUNTER — RX RENEWAL (OUTPATIENT)
Age: 43
End: 2023-02-21

## 2023-02-28 ENCOUNTER — RX RENEWAL (OUTPATIENT)
Age: 43
End: 2023-02-28

## 2023-06-14 ENCOUNTER — APPOINTMENT (OUTPATIENT)
Dept: MRI IMAGING | Facility: CLINIC | Age: 43
End: 2023-06-14
Payer: COMMERCIAL

## 2023-06-14 ENCOUNTER — OUTPATIENT (OUTPATIENT)
Dept: OUTPATIENT SERVICES | Facility: HOSPITAL | Age: 43
LOS: 1 days | End: 2023-06-14
Payer: COMMERCIAL

## 2023-06-14 DIAGNOSIS — G40.109 LOCALIZATION-RELATED (FOCAL) (PARTIAL) SYMPTOMATIC EPILEPSY AND EPILEPTIC SYNDROMES WITH SIMPLE PARTIAL SEIZURES, NOT INTRACTABLE, WITHOUT STATUS EPILEPTICUS: ICD-10-CM

## 2023-06-14 PROCEDURE — 70553 MRI BRAIN STEM W/O & W/DYE: CPT | Mod: 26

## 2023-06-14 PROCEDURE — 70553 MRI BRAIN STEM W/O & W/DYE: CPT

## 2023-06-14 PROCEDURE — A9585: CPT

## 2023-06-26 ENCOUNTER — NON-APPOINTMENT (OUTPATIENT)
Age: 43
End: 2023-06-26

## 2023-07-11 ENCOUNTER — APPOINTMENT (OUTPATIENT)
Dept: NEUROLOGY | Facility: CLINIC | Age: 43
End: 2023-07-11
Payer: COMMERCIAL

## 2023-07-11 VITALS
OXYGEN SATURATION: 97 % | RESPIRATION RATE: 17 BRPM | WEIGHT: 242 LBS | SYSTOLIC BLOOD PRESSURE: 125 MMHG | HEART RATE: 79 BPM | HEIGHT: 69 IN | DIASTOLIC BLOOD PRESSURE: 83 MMHG | BODY MASS INDEX: 35.84 KG/M2 | TEMPERATURE: 98.7 F

## 2023-07-11 PROCEDURE — 99214 OFFICE O/P EST MOD 30 MIN: CPT

## 2023-07-11 RX ORDER — LACOSAMIDE 200 MG/1
200 TABLET ORAL
Qty: 180 | Refills: 0 | Status: DISCONTINUED | COMMUNITY
Start: 2022-08-05 | End: 2023-07-11

## 2023-07-11 NOTE — HISTORY OF PRESENT ILLNESS
[FreeTextEntry1] : 7/11/23 HPI:\par \par Still getting occasional auras, presenting as an electrical "zing". Occurs daily but no seizures. Recent Lacosamide level 4.2 on 200mg BID - level drawn immediately after AM dose. Denies side effects. \par \par Recent MRI showed unchanged Hamartoma. 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 Normal milestones\par \par She has 2 children and works full-time. She drives.\par \par Drinks alcohol.\par \par No history of head injuries, meningitis or any CNS disorders.\par \par No current history of systemic illnesses.\par No psychiatric history except for a history of anxiety for many years\par \par \par PMHx\par \par Lorna reports that approximately in February 2021 she had a weird feeling and then lost awareness for a few seconds. Eventually this episodes became more frequent and she was referred to see a neurologist. She saw a neurologist in [place] Dr. Duggan who did an EEG that was normal and also an ambulatory EEG 48 hours were several episodes were captured. She was told at one point that she had some type of seizures but did not start medications. Eventually she went back to him and he told her that the EEG was normal.\par \par It is unclear what happened next but over the past 6 months these episodes have become more and more frequent. She had a brain MRI that was normal. Report is attached to the medical records. She had also some blood work that was reported unremarkable.\par \par Recurrent episodes\par She described the episodes as a feeling of losing awareness for a few seconds. She says she feels that they are very short. She then comes back and is little and is a little bit slow but then she eventually recovers within seconds. I spoke to the  on the phone who reported that she always does a very stereotyped type of behavior.\par She reports that she always verbalizes saying all my God oh my God all my God while she is moving her fingers and tapping her thumbs. She is amnestic of the events. The events according to him last between 10 and 15 seconds and another 5 seconds to recover. He does not believe she is groggy or sleepy after these episodes. The episodes are occurring almost every day and at times she may have up to 3 a day.\par With 2 of these episodes she had urinary incontinence. She was standing on of the grocery stores to check out and then suddenly saw that she had an episode and was wet. Another episode was when she was getting into the car.\par She says she has had episodes while driving and never had an accident. She reports that she can think of certain things like relax and that is when the episodes will occur.\par Frequency: At least 1 a day. Sometimes may go a day without them.\par Triggers: Relaxation or thinking about the episodes\par \par No anxiety in general associated with them\par \par HPI 1/30/2023\par \par No further seizures\par \par Meds; Vimpat 200 mg BID\par \par Gets auras from time to time. Once a day.\par Anxiety.\par \par \par HPI 10/6/22\par  Continue to have some seizures. Less frequent sz\par \par Meds:\par Vimpat 100 am and 150 mg pm\par \par MRI done yesterday at [place]. I reviewed the MRI. Still small thickening of Left amygdala \par \par HPI 9/13/22\par \par Lorna has been doing better on Vimpat 100 mg twice a day which she increased over the past several weeks.\par She is working from [place] permanently.\par No side effects from the medication.\par She saw a neurosurgeon at [place] who told her that the left amygdala looked bigger but he could not confirm the diagnosis.\par She seems to have less anxiety as well.\par She lost her job and she is now in transition. Kids in school in [place]. She is returning here in a few weeks.\par \par HPI 8/4/22\par \par Admitted to hospital last week x VEEG. She got hook up and 2-3 hs later AMA because she couldn’t stand it. 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 \par REEG today shows freq Left temporal sharp waves \par

## 2023-07-11 NOTE — DISCUSSION/SUMMARY
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

## 2023-09-12 ENCOUNTER — APPOINTMENT (OUTPATIENT)
Dept: NEUROLOGY | Facility: CLINIC | Age: 43
End: 2023-09-12
Payer: COMMERCIAL

## 2023-09-12 VITALS
HEIGHT: 69 IN | TEMPERATURE: 97.6 F | DIASTOLIC BLOOD PRESSURE: 84 MMHG | HEART RATE: 83 BPM | WEIGHT: 247 LBS | OXYGEN SATURATION: 95 % | SYSTOLIC BLOOD PRESSURE: 120 MMHG | BODY MASS INDEX: 36.58 KG/M2

## 2023-09-12 PROCEDURE — 99214 OFFICE O/P EST MOD 30 MIN: CPT

## 2023-10-11 ENCOUNTER — TRANSCRIPTION ENCOUNTER (OUTPATIENT)
Age: 43
End: 2023-10-11

## 2023-10-11 LAB
ALBUMIN SERPL ELPH-MCNC: 4.5 G/DL
ALP BLD-CCNC: 75 U/L
ALT SERPL-CCNC: 9 U/L
ANION GAP SERPL CALC-SCNC: 13 MMOL/L
AST SERPL-CCNC: 14 U/L
BASOPHILS # BLD AUTO: 0.05 K/UL
BASOPHILS NFR BLD AUTO: 0.7 %
BILIRUB SERPL-MCNC: 0.2 MG/DL
BUN SERPL-MCNC: 11 MG/DL
CALCIUM SERPL-MCNC: 9.2 MG/DL
CHLORIDE SERPL-SCNC: 105 MMOL/L
CHOLEST SERPL-MCNC: 190 MG/DL
CO2 SERPL-SCNC: 24 MMOL/L
CREAT SERPL-MCNC: 0.98 MG/DL
EGFR: 73 ML/MIN/1.73M2
EOSINOPHIL # BLD AUTO: 0.23 K/UL
EOSINOPHIL NFR BLD AUTO: 3.3 %
GLUCOSE SERPL-MCNC: 86 MG/DL
HCT VFR BLD CALC: 47 %
HDLC SERPL-MCNC: 53 MG/DL
HGB BLD-MCNC: 15.2 G/DL
IMM GRANULOCYTES NFR BLD AUTO: 0.3 %
LDLC SERPL CALC-MCNC: 105 MG/DL
LYMPHOCYTES # BLD AUTO: 2.36 K/UL
LYMPHOCYTES NFR BLD AUTO: 33.6 %
MAN DIFF?: NORMAL
MCHC RBC-ENTMCNC: 31.8 PG
MCHC RBC-ENTMCNC: 32.3 GM/DL
MCV RBC AUTO: 98.3 FL
MONOCYTES # BLD AUTO: 0.4 K/UL
MONOCYTES NFR BLD AUTO: 5.7 %
NEUTROPHILS # BLD AUTO: 3.96 K/UL
NEUTROPHILS NFR BLD AUTO: 56.4 %
NONHDLC SERPL-MCNC: 137 MG/DL
PLATELET # BLD AUTO: 191 K/UL
POTASSIUM SERPL-SCNC: 4.9 MMOL/L
PROT SERPL-MCNC: 6.7 G/DL
RBC # BLD: 4.78 M/UL
RBC # FLD: 13 %
SODIUM SERPL-SCNC: 143 MMOL/L
TRIGL SERPL-MCNC: 187 MG/DL
TSH SERPL-ACNC: 1.94 UIU/ML
WBC # FLD AUTO: 7.02 K/UL

## 2023-10-16 LAB — LACOSAMIDE (VIMPAT): 5.39 UG/ML

## 2023-12-07 ENCOUNTER — APPOINTMENT (OUTPATIENT)
Dept: NEUROLOGY | Facility: CLINIC | Age: 43
End: 2023-12-07
Payer: COMMERCIAL

## 2023-12-07 VITALS
WEIGHT: 247 LBS | HEIGHT: 69 IN | OXYGEN SATURATION: 97 % | DIASTOLIC BLOOD PRESSURE: 77 MMHG | SYSTOLIC BLOOD PRESSURE: 131 MMHG | HEART RATE: 84 BPM | TEMPERATURE: 98.2 F | BODY MASS INDEX: 36.58 KG/M2

## 2023-12-07 PROCEDURE — 99214 OFFICE O/P EST MOD 30 MIN: CPT

## 2023-12-07 RX ORDER — LACOSAMIDE 50 MG/1
50 TABLET ORAL DAILY
Qty: 90 | Refills: 0 | Status: DISCONTINUED | COMMUNITY
Start: 2022-09-30 | End: 2023-12-07

## 2024-01-11 ENCOUNTER — RX RENEWAL (OUTPATIENT)
Age: 44
End: 2024-01-11

## 2024-04-15 ENCOUNTER — RX RENEWAL (OUTPATIENT)
Age: 44
End: 2024-04-15

## 2024-04-15 RX ORDER — LACOSAMIDE 100 MG/1
100 TABLET ORAL
Qty: 360 | Refills: 0 | Status: ACTIVE | COMMUNITY
Start: 2022-08-05 | End: 1900-01-01

## 2024-06-04 ENCOUNTER — TRANSCRIPTION ENCOUNTER (OUTPATIENT)
Age: 44
End: 2024-06-04

## 2024-06-04 DIAGNOSIS — G40.109 LOCALIZATION-RELATED (FOCAL) (PARTIAL) SYMPTOMATIC EPILEPSY AND EPILEPTIC SYNDROMES WITH SIMPLE PARTIAL SEIZURES, NOT INTRACTABLE, W/OUT STATUS EPILEPTICUS: ICD-10-CM

## 2024-06-10 ENCOUNTER — NON-APPOINTMENT (OUTPATIENT)
Age: 44
End: 2024-06-10

## 2024-06-12 ENCOUNTER — APPOINTMENT (OUTPATIENT)
Dept: MRI IMAGING | Facility: CLINIC | Age: 44
End: 2024-06-12
Payer: COMMERCIAL

## 2024-06-12 ENCOUNTER — OUTPATIENT (OUTPATIENT)
Dept: OUTPATIENT SERVICES | Facility: HOSPITAL | Age: 44
LOS: 1 days | End: 2024-06-12
Payer: COMMERCIAL

## 2024-06-12 DIAGNOSIS — G40.109 LOCALIZATION-RELATED (FOCAL) (PARTIAL) SYMPTOMATIC EPILEPSY AND EPILEPTIC SYNDROMES WITH SIMPLE PARTIAL SEIZURES, NOT INTRACTABLE, WITHOUT STATUS EPILEPTICUS: ICD-10-CM

## 2024-06-12 PROCEDURE — A9585: CPT

## 2024-06-12 PROCEDURE — 70553 MRI BRAIN STEM W/O & W/DYE: CPT

## 2024-06-12 PROCEDURE — 70553 MRI BRAIN STEM W/O & W/DYE: CPT | Mod: 26

## 2024-06-19 ENCOUNTER — NON-APPOINTMENT (OUTPATIENT)
Age: 44
End: 2024-06-19

## 2024-06-24 ENCOUNTER — NON-APPOINTMENT (OUTPATIENT)
Age: 44
End: 2024-06-24

## 2024-07-11 ENCOUNTER — TRANSCRIPTION ENCOUNTER (OUTPATIENT)
Age: 44
End: 2024-07-11

## 2024-07-16 ENCOUNTER — RX RENEWAL (OUTPATIENT)
Age: 44
End: 2024-07-16

## 2024-07-18 ENCOUNTER — APPOINTMENT (OUTPATIENT)
Dept: NEUROLOGY | Facility: CLINIC | Age: 44
End: 2024-07-18

## 2024-07-19 ENCOUNTER — RX RENEWAL (OUTPATIENT)
Age: 44
End: 2024-07-19

## 2024-07-23 ENCOUNTER — LABORATORY RESULT (OUTPATIENT)
Age: 44
End: 2024-07-23

## 2024-07-23 ENCOUNTER — APPOINTMENT (OUTPATIENT)
Dept: NEUROLOGY | Facility: CLINIC | Age: 44
End: 2024-07-23
Payer: COMMERCIAL

## 2024-07-23 DIAGNOSIS — F41.9 ANXIETY DISORDER, UNSPECIFIED: ICD-10-CM

## 2024-07-23 DIAGNOSIS — G40.109 LOCALIZATION-RELATED (FOCAL) (PARTIAL) SYMPTOMATIC EPILEPSY AND EPILEPTIC SYNDROMES WITH SIMPLE PARTIAL SEIZURES, NOT INTRACTABLE, W/OUT STATUS EPILEPTICUS: ICD-10-CM

## 2024-07-23 PROCEDURE — 99214 OFFICE O/P EST MOD 30 MIN: CPT

## 2024-07-23 PROCEDURE — 95816 EEG AWAKE AND DROWSY: CPT

## 2024-07-23 PROCEDURE — G2211 COMPLEX E/M VISIT ADD ON: CPT

## 2024-07-23 NOTE — HISTORY OF PRESENT ILLNESS
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  Past medical history  The patient has no past medical history of significance.  She was born from a normal pregnancy and delivery.  Normal milestones  She has 2 children and works full-time.  She drives.  Drinks alcohol.  No history of head injuries, meningitis or any CNS disorders.  No current history of systemic illnesses. No psychiatric history except for a history of anxiety for many years   [facility]  Lorna reports that approximately in February 2021 she had a weird feeling and then lost awareness for a few seconds.  Eventually this episodes became more frequent and she was referred to see a neurologist.  She saw a neurologist in [place] Dr. Duggan who did an EEG that was normal and also an ambulatory EEG 48 hours were several episodes were captured.  She was told at one point that she had some type of seizures but did not start medications.  Eventually she went back to him and he told her that the EEG was normal.  It is unclear what happened next but over the past 6 months these episodes have become more and more frequent.  She had a brain MRI that was normal.  Report is attached to the medical records.  She had also some blood work that was reported unremarkable.  Recurrent episodes She described the episodes as a feeling of losing awareness for a few seconds.  She says she feels that they are very short.  She then comes back and is little and is a little bit slow but then she eventually recovers within seconds.  I spoke to the  on the phone who reported that she always does a very stereotyped type of behavior. She reports that she always verbalizes saying all my God oh my God all my God while she is moving her fingers and tapping her thumbs.  She is amnestic of the events.  The events according to him last between 10 and 15 seconds and another 5 seconds to recover.  He does not believe she is groggy or sleepy after these episodes.  The episodes are occurring almost every day and at times she may have up to 3 a day. With 2 of these episodes she had urinary incontinence.  She was standing on of the grocery stores to check out and then suddenly saw that she had an episode and was wet.  Another episode was when she was getting into the car. She says she has had episodes while driving and never had an accident.  She reports that she can think of certain things like relax and that is when the episodes will occur. Frequency: At least 1 a day.  Sometimes may go a day without them. Triggers: Relaxation or thinking about the episodes  No anxiety in general associated with them   HPI     7/23/24 No seizures MRI done showed no changes in lesion  EEG: normal today No auras.   No other symptoms  Meds; Vimpat 200 mg BID   HPI    1/30/2023  No further seizures  Meds; Vimpat 200 mg BID  Gets auras from time to time. Once a day. Anxiety.   HPI   10/6/22  Continue to have some seizures. Less frequent sz  Meds: Vimpat 100 am and 150 mg pm  MRI done yesterday at [place]. I reviewed the MRI. Still small thickening of Left amygdala   HPI     9/13/22  Lorna has been doing better on Vimpat 100 mg twice a day which she increased over the past several weeks. She is working from [place] permanently. No side effects from the medication. She saw a neurosurgeon at [place] who told her that the left amygdala looked bigger but he could not confirm the diagnosis. She seems to have less anxiety as well. She lost her job and she is now in transition.  Kids in school in [place].  She is returning here in a few weeks.  HPI    8/4/22  Admitted to hospital last week x VEEG. She got hook up and 2-3 hs later AMA because she couldn't stand it. She got very anxious and went home She then came back today for AEEG. She reports having events qday at least 1-2 per day. Had AEEG and reports two episodes. I reviewed the outside MRI and I see a small mass in the left amygdala.  REEG today shows freq Left temporal sharp waves

## 2024-07-23 NOTE — DISCUSSION/SUMMARY
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 Doing much better on Vimpat monotherapy.

## 2024-07-24 LAB
ALBUMIN SERPL ELPH-MCNC: 4.7 G/DL
ALP BLD-CCNC: 75 U/L
ALT SERPL-CCNC: 25 U/L
ANION GAP SERPL CALC-SCNC: 16 MMOL/L
AST SERPL-CCNC: 27 U/L
BILIRUB SERPL-MCNC: 0.4 MG/DL
BUN SERPL-MCNC: 8 MG/DL
CALCIUM SERPL-MCNC: 9.3 MG/DL
CHLORIDE SERPL-SCNC: 102 MMOL/L
CHOLEST SERPL-MCNC: 205 MG/DL
CO2 SERPL-SCNC: 20 MMOL/L
CREAT SERPL-MCNC: 0.92 MG/DL
EGFR: 79 ML/MIN/1.73M2
GLUCOSE SERPL-MCNC: 93 MG/DL
HDLC SERPL-MCNC: 66 MG/DL
LDLC SERPL CALC-MCNC: 121 MG/DL
NONHDLC SERPL-MCNC: 139 MG/DL
POTASSIUM SERPL-SCNC: 4.5 MMOL/L
PROT SERPL-MCNC: 7.1 G/DL
SODIUM SERPL-SCNC: 138 MMOL/L
TRIGL SERPL-MCNC: 102 MG/DL

## 2024-07-26 LAB — APO LP(A) SERPL-MCNC: 125.7 NMOL/L

## 2024-07-29 LAB — LACOSAMIDE (VIMPAT): 8.13 UG/ML

## 2024-07-31 ENCOUNTER — APPOINTMENT (OUTPATIENT)
Dept: MAMMOGRAPHY | Facility: IMAGING CENTER | Age: 44
End: 2024-07-31
Payer: COMMERCIAL

## 2024-07-31 ENCOUNTER — APPOINTMENT (OUTPATIENT)
Dept: ULTRASOUND IMAGING | Facility: IMAGING CENTER | Age: 44
End: 2024-07-31
Payer: COMMERCIAL

## 2024-07-31 ENCOUNTER — OUTPATIENT (OUTPATIENT)
Dept: OUTPATIENT SERVICES | Facility: HOSPITAL | Age: 44
LOS: 1 days | End: 2024-07-31
Payer: COMMERCIAL

## 2024-07-31 DIAGNOSIS — Z00.8 ENCOUNTER FOR OTHER GENERAL EXAMINATION: ICD-10-CM

## 2024-07-31 PROCEDURE — 76641 ULTRASOUND BREAST COMPLETE: CPT | Mod: 26,50

## 2024-07-31 PROCEDURE — 77067 SCR MAMMO BI INCL CAD: CPT

## 2024-07-31 PROCEDURE — 77067 SCR MAMMO BI INCL CAD: CPT | Mod: 26

## 2024-07-31 PROCEDURE — 77063 BREAST TOMOSYNTHESIS BI: CPT | Mod: 26

## 2024-07-31 PROCEDURE — 77063 BREAST TOMOSYNTHESIS BI: CPT

## 2024-07-31 PROCEDURE — 76641 ULTRASOUND BREAST COMPLETE: CPT

## 2024-10-10 ENCOUNTER — APPOINTMENT (OUTPATIENT)
Dept: HEART AND VASCULAR | Facility: CLINIC | Age: 44
End: 2024-10-10
Payer: COMMERCIAL

## 2024-10-10 VITALS
DIASTOLIC BLOOD PRESSURE: 87 MMHG | OXYGEN SATURATION: 97 % | HEART RATE: 81 BPM | SYSTOLIC BLOOD PRESSURE: 130 MMHG | TEMPERATURE: 97.1 F | HEIGHT: 69 IN | WEIGHT: 254 LBS | BODY MASS INDEX: 37.62 KG/M2

## 2024-10-10 DIAGNOSIS — E78.5 HYPERLIPIDEMIA, UNSPECIFIED: ICD-10-CM

## 2024-10-10 DIAGNOSIS — R01.1 CARDIAC MURMUR, UNSPECIFIED: ICD-10-CM

## 2024-10-10 DIAGNOSIS — E78.41 ELEVATED LIPOPROTEIN(A): ICD-10-CM

## 2024-10-10 PROCEDURE — 99204 OFFICE O/P NEW MOD 45 MIN: CPT

## 2024-10-10 PROCEDURE — G2211 COMPLEX E/M VISIT ADD ON: CPT | Mod: NC

## 2024-10-10 PROCEDURE — 93000 ELECTROCARDIOGRAM COMPLETE: CPT

## 2024-10-11 ENCOUNTER — RX RENEWAL (OUTPATIENT)
Age: 44
End: 2024-10-11

## 2024-11-07 ENCOUNTER — RESULT REVIEW (OUTPATIENT)
Age: 44
End: 2024-11-07

## 2024-11-08 ENCOUNTER — TRANSCRIPTION ENCOUNTER (OUTPATIENT)
Age: 44
End: 2024-11-08

## 2024-11-09 ENCOUNTER — TRANSCRIPTION ENCOUNTER (OUTPATIENT)
Age: 44
End: 2024-11-09

## 2024-11-11 ENCOUNTER — TRANSCRIPTION ENCOUNTER (OUTPATIENT)
Age: 44
End: 2024-11-11

## 2024-11-20 ENCOUNTER — APPOINTMENT (OUTPATIENT)
Dept: HEART AND VASCULAR | Facility: CLINIC | Age: 44
End: 2024-11-20
Payer: COMMERCIAL

## 2024-11-20 DIAGNOSIS — R01.1 CARDIAC MURMUR, UNSPECIFIED: ICD-10-CM

## 2024-11-20 DIAGNOSIS — E78.5 HYPERLIPIDEMIA, UNSPECIFIED: ICD-10-CM

## 2024-11-20 DIAGNOSIS — E78.41 ELEVATED LIPOPROTEIN(A): ICD-10-CM

## 2024-11-20 PROCEDURE — 99215 OFFICE O/P EST HI 40 MIN: CPT

## 2024-11-20 RX ORDER — ROSUVASTATIN CALCIUM 10 MG/1
10 TABLET, FILM COATED ORAL
Qty: 90 | Refills: 3 | Status: ACTIVE | COMMUNITY
Start: 2024-11-20 | End: 1900-01-01

## 2024-11-21 ENCOUNTER — TRANSCRIPTION ENCOUNTER (OUTPATIENT)
Age: 44
End: 2024-11-21

## 2024-11-22 ENCOUNTER — TRANSCRIPTION ENCOUNTER (OUTPATIENT)
Age: 44
End: 2024-11-22

## 2024-11-25 ENCOUNTER — TRANSCRIPTION ENCOUNTER (OUTPATIENT)
Age: 44
End: 2024-11-25

## 2024-12-02 ENCOUNTER — TRANSCRIPTION ENCOUNTER (OUTPATIENT)
Age: 44
End: 2024-12-02

## 2024-12-12 ENCOUNTER — TRANSCRIPTION ENCOUNTER (OUTPATIENT)
Age: 44
End: 2024-12-12

## 2024-12-12 DIAGNOSIS — E04.1 NONTOXIC SINGLE THYROID NODULE: ICD-10-CM

## 2024-12-13 ENCOUNTER — TRANSCRIPTION ENCOUNTER (OUTPATIENT)
Age: 44
End: 2024-12-13

## 2025-01-14 ENCOUNTER — TRANSCRIPTION ENCOUNTER (OUTPATIENT)
Age: 45
End: 2025-01-14

## 2025-04-15 ENCOUNTER — TRANSCRIPTION ENCOUNTER (OUTPATIENT)
Age: 45
End: 2025-04-15

## 2025-07-10 ENCOUNTER — TRANSCRIPTION ENCOUNTER (OUTPATIENT)
Age: 45
End: 2025-07-10

## 2025-07-11 ENCOUNTER — TRANSCRIPTION ENCOUNTER (OUTPATIENT)
Age: 45
End: 2025-07-11

## 2025-07-29 ENCOUNTER — APPOINTMENT (OUTPATIENT)
Dept: NEUROLOGY | Facility: CLINIC | Age: 45
End: 2025-07-29
Payer: COMMERCIAL

## 2025-07-29 PROCEDURE — 95816 EEG AWAKE AND DROWSY: CPT

## 2025-07-31 ENCOUNTER — APPOINTMENT (OUTPATIENT)
Dept: NEUROLOGY | Facility: CLINIC | Age: 45
End: 2025-07-31
Payer: COMMERCIAL

## 2025-07-31 PROCEDURE — 99214 OFFICE O/P EST MOD 30 MIN: CPT | Mod: 95

## 2025-07-31 PROCEDURE — G2211 COMPLEX E/M VISIT ADD ON: CPT | Mod: NC,95

## 2025-08-01 ENCOUNTER — APPOINTMENT (OUTPATIENT)
Dept: ULTRASOUND IMAGING | Facility: IMAGING CENTER | Age: 45
End: 2025-08-01
Payer: COMMERCIAL

## 2025-08-01 ENCOUNTER — APPOINTMENT (OUTPATIENT)
Dept: MAMMOGRAPHY | Facility: IMAGING CENTER | Age: 45
End: 2025-08-01
Payer: COMMERCIAL

## 2025-08-01 ENCOUNTER — OUTPATIENT (OUTPATIENT)
Dept: OUTPATIENT SERVICES | Facility: HOSPITAL | Age: 45
LOS: 1 days | End: 2025-08-01
Payer: COMMERCIAL

## 2025-08-01 DIAGNOSIS — E04.1 NONTOXIC SINGLE THYROID NODULE: ICD-10-CM

## 2025-08-01 DIAGNOSIS — Z00.8 ENCOUNTER FOR OTHER GENERAL EXAMINATION: ICD-10-CM

## 2025-08-01 DIAGNOSIS — F41.9 ANXIETY DISORDER, UNSPECIFIED: ICD-10-CM

## 2025-08-01 DIAGNOSIS — G40.109 LOCALIZATION-RELATED (FOCAL) (PARTIAL) SYMPTOMATIC EPILEPSY AND EPILEPTIC SYNDROMES WITH SIMPLE PARTIAL SEIZURES, NOT INTRACTABLE, W/OUT STATUS EPILEPTICUS: ICD-10-CM

## 2025-08-01 PROCEDURE — 77067 SCR MAMMO BI INCL CAD: CPT | Mod: 26

## 2025-08-01 PROCEDURE — 99214 OFFICE O/P EST MOD 30 MIN: CPT | Mod: 95

## 2025-08-01 PROCEDURE — 77063 BREAST TOMOSYNTHESIS BI: CPT

## 2025-08-01 PROCEDURE — G2211 COMPLEX E/M VISIT ADD ON: CPT | Mod: NC,95

## 2025-08-01 PROCEDURE — 77067 SCR MAMMO BI INCL CAD: CPT

## 2025-08-01 PROCEDURE — 77063 BREAST TOMOSYNTHESIS BI: CPT | Mod: 26
